# Patient Record
Sex: FEMALE | Race: ASIAN | NOT HISPANIC OR LATINO | Employment: PART TIME | URBAN - METROPOLITAN AREA
[De-identification: names, ages, dates, MRNs, and addresses within clinical notes are randomized per-mention and may not be internally consistent; named-entity substitution may affect disease eponyms.]

---

## 2024-08-12 ENCOUNTER — HOSPITAL ENCOUNTER (EMERGENCY)
Facility: HOSPITAL | Age: 34
Discharge: HOME | End: 2024-08-12
Attending: STUDENT IN AN ORGANIZED HEALTH CARE EDUCATION/TRAINING PROGRAM | Admitting: STUDENT IN AN ORGANIZED HEALTH CARE EDUCATION/TRAINING PROGRAM

## 2024-08-12 VITALS
WEIGHT: 123 LBS | TEMPERATURE: 99.1 F | BODY MASS INDEX: 26.54 KG/M2 | DIASTOLIC BLOOD PRESSURE: 104 MMHG | OXYGEN SATURATION: 100 % | HEIGHT: 57 IN | HEART RATE: 118 BPM | RESPIRATION RATE: 16 BRPM | SYSTOLIC BLOOD PRESSURE: 184 MMHG

## 2024-08-12 DIAGNOSIS — B02.9 HERPES ZOSTER WITHOUT COMPLICATION: ICD-10-CM

## 2024-08-12 PROCEDURE — 250N000013 HC RX MED GY IP 250 OP 250 PS 637: Performed by: STUDENT IN AN ORGANIZED HEALTH CARE EDUCATION/TRAINING PROGRAM

## 2024-08-12 PROCEDURE — 99284 EMERGENCY DEPT VISIT MOD MDM: CPT

## 2024-08-12 RX ORDER — LIDOCAINE 4 G/G
1 PATCH TOPICAL ONCE
Status: DISCONTINUED | OUTPATIENT
Start: 2024-08-13 | End: 2024-08-13 | Stop reason: HOSPADM

## 2024-08-12 RX ORDER — IBUPROFEN 600 MG/1
600 TABLET, FILM COATED ORAL ONCE
Status: COMPLETED | OUTPATIENT
Start: 2024-08-12 | End: 2024-08-12

## 2024-08-12 RX ORDER — OXYCODONE HYDROCHLORIDE 5 MG/1
5 TABLET ORAL EVERY 6 HOURS PRN
Qty: 12 TABLET | Refills: 0 | Status: SHIPPED | OUTPATIENT
Start: 2024-08-12 | End: 2024-08-15

## 2024-08-12 RX ORDER — OXYCODONE HYDROCHLORIDE 5 MG/1
5 TABLET ORAL ONCE
Status: COMPLETED | OUTPATIENT
Start: 2024-08-12 | End: 2024-08-12

## 2024-08-12 RX ORDER — LIDOCAINE 50 MG/G
1 PATCH TOPICAL EVERY 24 HOURS
Qty: 14 PATCH | Refills: 0 | Status: SHIPPED | OUTPATIENT
Start: 2024-08-12 | End: 2024-08-26

## 2024-08-12 RX ADMIN — LIDOCAINE 1 PATCH: 4 PATCH TOPICAL at 23:36

## 2024-08-12 RX ADMIN — OXYCODONE HYDROCHLORIDE 5 MG: 5 TABLET ORAL at 23:35

## 2024-08-12 RX ADMIN — IBUPROFEN 600 MG: 600 TABLET, FILM COATED ORAL at 23:35

## 2024-08-12 NOTE — Clinical Note
Dequan Go was seen and treated in our emergency department on 8/12/2024.  She may return to work on 08/14/2024.       If you have any questions or concerns, please don't hesitate to call.      Kamran White MD

## 2024-08-13 NOTE — ED PROVIDER NOTES
EMERGENCY DEPARTMENT ENCOUNTER      NAME: Dequan Go  AGE: 34 year old female  YOB: 1990  MRN: [id number]  EVALUATION DATE & TIME: 8/12/2024 11:17 PM    PCP: No primary care provider on file.    ED PROVIDER: Kamran White MD      Chief Complaint   Patient presents with    Shingles         FINAL IMPRESSION:  1. Herpes zoster without complication          ED COURSE & MEDICAL DECISION MAKING:    Pertinent Labs & Imaging studies reviewed. (See chart for details)  34 year old female presents to the Emergency Department for evaluation of shingles pain.    Patient on exam continues to have what appears to be uncomplicated right sided shingles, without accompanying cellulitis.  She does endorse occasional tingling in bilateral hands and feet, but this is likely due to hyperventilation, and may be splinting from the pain that she has along her chest wall rash.  Her neurologic exam is nonfocal, and I have a low suspicion that this is disseminated shingles.  She has been using Tylenol alone to treat her symptoms, and I discussed with her that this is likely not adequate for the acute phase of an infection with shingles.  Will treat with ibuprofen, lidocaine patch, oxycodone here in the emergency department, and instructed her to use these as an outpatient, as lidocaine patch and oxycodone prescribed for several days.  Return precautions provided and discharged.    ED Course as of 08/12/24 2333   Mon Aug 12, 2024   0962  I met with the patient to gather history and perform an initial exam.       Medical Decision Making  Obtained supplemental history:Supplemental history obtained?: Documented in chart  Reviewed external records: External records reviewed?: Documented in chart  Care impacted by chronic illness:N/A  Care significantly affected by social determinants of health:N/A  Did you consider but not order tests?: Work up considered but not performed and documented in chart, if applicable  Did you  interpret images independently?: Independent interpretation of ECG and images noted in documentation, when applicable.  Consultation discussion with other provider:Did you involve another provider (consultant, , pharmacy, etc.)?: No  Discharge. I prescribed additional prescription strength medication(s) as charted. See documentation for any additional details.        At the conclusion of the encounter I discussed the results of all of the tests and the disposition. The questions were answered. The patient or family acknowledged understanding and was agreeable with the care plan.     0 minutes of critical care time     MEDICATIONS GIVEN IN THE EMERGENCY:  Medications   oxyCODONE (ROXICODONE) tablet 5 mg (has no administration in time range)   Lidocaine (LIDOCARE) 4 % Patch 1 patch (has no administration in time range)   ibuprofen (ADVIL/MOTRIN) tablet 600 mg (has no administration in time range)       NEW PRESCRIPTIONS STARTED AT TODAY'S ER VISIT  New Prescriptions    LIDOCAINE (LIDODERM) 5 % PATCH    Place 1 patch onto the skin every 24 hours for 14 days To prevent lidocaine toxicity, patient should be patch free for 12 hrs daily.    OXYCODONE (ROXICODONE) 5 MG TABLET    Take 1 tablet (5 mg) by mouth every 6 hours as needed for breakthrough pain          =================================================================    HPI    Patient information was obtained from: patient     Use of : Yes (HOLDEN In Person) - Language Latvian        Dequan Go is a 34 year old female with a pertinent history of vitamin D deficiency and fatigue who presents to this ED via walk-in for evaluation of shingles.     Patient reports that she has shingles on the right side of her back and lower abdomen. She has been using tylenol every 6 hours. Endorses tingling and numbness to her head and legs.     Patient was seen at Urgent Care recently and was started on Valacyclovir on 8/6/2024 per triage note.       PAST MEDICAL  "HISTORY:  History reviewed. No pertinent past medical history.    PAST SURGICAL HISTORY:  History reviewed. No pertinent surgical history.        CURRENT MEDICATIONS:    lidocaine (LIDODERM) 5 % patch  oxyCODONE (ROXICODONE) 5 MG tablet  etonogestrel (NEXPLANON) 68 mg Impl implant  MEDICATION CANNOT BE REORDERED - PLEASE MANUALLY REORDER AND DISCONTINUE THE OLD ORDER        ALLERGIES:  No Known Allergies    FAMILY HISTORY:  History reviewed. No pertinent family history.    SOCIAL HISTORY:   Social History     Socioeconomic History    Marital status:    Tobacco Use    Smoking status: Never       VITALS:  BP (!) 184/104   Pulse 118   Temp 99.1  F (37.3  C) (Temporal)   Resp 16   Ht 1.448 m (4' 9\")   Wt 55.8 kg (123 lb)   SpO2 100%   BMI 26.62 kg/m      PHYSICAL EXAM    Physical Exam  Vitals and nursing note reviewed.   Constitutional:       General: She is not in acute distress.     Appearance: Normal appearance. She is normal weight.   HENT:      Head: Normocephalic and atraumatic.      Nose: Nose normal.      Mouth/Throat:      Pharynx: Oropharynx is clear.   Eyes:      Conjunctiva/sclera: Conjunctivae normal.   Cardiovascular:      Rate and Rhythm: Regular rhythm. Tachycardia present.      Pulses: Normal pulses.   Pulmonary:      Effort: Pulmonary effort is normal.   Abdominal:      General: Abdomen is flat. There is no distension.      Palpations: Abdomen is soft.      Tenderness: There is no abdominal tenderness.   Musculoskeletal:         General: Normal range of motion.      Cervical back: Normal range of motion.      Right lower leg: No edema.      Left lower leg: No edema.   Skin:     General: Skin is warm and dry.      Findings: Rash (Right-sided healing vesicular lesions along the right posterior chest, and radiating down to the right lower quadrant of her abdomen.  No surrounding erythema, fluctuance, purulence.  No left-sided lesions.) present.   Neurological:      General: No focal deficit " present.      Mental Status: She is alert and oriented to person, place, and time. Mental status is at baseline.   Psychiatric:         Mood and Affect: Mood normal.         Behavior: Behavior normal.            LAB:  All pertinent labs reviewed and interpreted.       RADIOLOGY:  Reviewed all pertinent imaging. Please see official radiology report.  No orders to display       PROCEDURES:   None      Graph Story System Documentation:   CMS Diagnoses:               I, Breanna Dane, am serving as a scribe to document services personally performed by Kamran White MD based on my observation and the provider's statements to me. I, Kamran White MD, attest that Breanna Vela is acting in a scribe capacity, has observed my performance of the services and has documented them in accordance with my direction.    Kamran White MD  [facility] EMERGENCY DEPARTMENT  [address] [place] [id number]  [phone]      Kamran White MD  08/12/24 1672

## 2024-08-13 NOTE — ED TRIAGE NOTES
The pt was seen at Urgent Care and started on Valacyclovir on 8/6/ Pt arrives with right side pain. Pt has a rash is tearful and c/o pain so bad her hands and feet get numb.      Triage Assessment (Adult)       Row Name 08/12/24 6200          Triage Assessment    Airway WDL WDL        Respiratory WDL    Respiratory WDL WDL        Skin Circulation/Temperature WDL    Skin Circulation/Temperature WDL X        Peripheral/Neurovascular WDL    Peripheral Neurovascular WDL WDL        Cognitive/Neuro/Behavioral WDL    Cognitive/Neuro/Behavioral WDL WDL

## 2024-08-13 NOTE — DISCHARGE INSTRUCTIONS
Your rash may persist for up to 4 weeks, and you may continue to have pain.  It is important to treat your symptoms with different pain medications in addition to the antiviral medications you are already taking.    Use 1000 mg Tylenol every 6 hours, in addition to 600 mg ibuprofen, and the 2 prescriptions that we are providing you with: Oxycodone and lidocaine patch.    Please return to the emergency department if your symptoms worsen despite these therapies, if you develop worsening symptoms, spreading rash.